# Patient Record
(demographics unavailable — no encounter records)

---
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
## 2024-10-14 NOTE — ASSESSMENT
[FreeTextEntry1] : Assessment: Neck pain status post injury at work in August.  Persistent pain.  Plan: 1.  Clinical and radiographic findings were discussed with the patient.  I reviewed today's x-rays with him.  I also discussed the CT report findings with him. 2.  Given that he is still having persistent pain in his neck as well as the mechanism of injury, and that there are degenerative changes with loss of disc height on x-rays, I recommended that we obtain an MRI of the cervical spine for further evaluation.  This was ordered today. 3.  In the meantime, I recommended that he start physical therapy.  He has a referral and will call to schedule this. 4.  He can continue to take over-the-counter NSAIDs as needed.  NSAID precautions were given. 5.  I would like to see him back for follow-up after the MRI is complete to review the results and to discuss the next steps in treatment.  Plan of care discussed with the patient and he is in agreement.  All questions were answered.  X-rays needed at next visit: None

## 2024-11-12 NOTE — HISTORY OF PRESENT ILLNESS
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  He reports that he continues to have intermittent pain over the posterior aspect of his neck.  No radiating pain in the arms.  No numbness or tingling.  He was not able to do physical therapy as he has been very busy.  He had an MRI done and is here to review the results.  The patient is well-appearing. Examination of the cervical spine demonstrates intact skin. No tenderness over the midline. No tenderness over the paraspinals. He does have discomfort over the posterior neck with cervical extension and flexion. 5 out of 5 strength across the bilateral deltoids, biceps, triceps, wrist extensors, wrist flexors, and hand intrinsics. Sensation intact throughout all dermatomes. Neurovascular intact distally.  Cervical spine MRI completed at [facility] on 10/31/2024 was reviewed, report: C3-4, disc herniation resulting in mild to moderate central stenosis.  Mild bilateral foraminal stenosis C4-5, disc herniation with moderate canal stenosis.  Mild bilateral foraminal stenosis C5-6, disc herniation with moderate central stenosis.  Mild bilateral foraminal stenosis C6/7, broad-based disc herniation with mild to moderate central stenosis.  Mild bilateral foraminal stenosis

## 2024-11-12 NOTE — REASON FOR VISIT
[FreeTextEntry2] : Follow-up for neck pain Date of injury: 8/28/2024 at work Here to review MRI results

## 2024-11-12 NOTE — ASSESSMENT
[FreeTextEntry1] : Assessment: Neck pain status post injury at work in August. Intermittent pain. MRI demonstrates multilevel cervical disc herniation and stenosis Clinically has no radicular symptoms  Plan: 1. Clinical and MRI report findings were discussed with the patient 2. I discussed treatment options with him, including physical therapy and the option of a referral to pain management for cervical spine injections. He reports that the pain is manageable at this time and kindly deferred treatment interventions 3.  I will be happy to see him back in the office on an as needed basis.  All questions were answered.  I encouraged her to reach out to the office should any questions or concerns arise.